# Patient Record
(demographics unavailable — no encounter records)

---

## 2024-10-07 NOTE — ASSESSMENT
[FreeTextEntry1] : 48 yo RHM w/ h/o chronic right hemifacial spasm here for chemodenervation. Had less response after last chemodenervation, increased dose of the nasalis injection and asked pt to take short video of face to help guide dosage adjustment at next visit.  In meantime, still awaiting MRI Brain w/wo von.   Recommendations: - MRI Brain w/w/o von - pt to take short video of facial response after 2 weeks - RTC in 3 months for chemodenervation.

## 2024-10-07 NOTE — PHYSICAL EXAM
[FreeTextEntry1] : NAD. AOx3. Lang intact, no dysarthria CN 2-12 w/ minimal intermittent R hemifacial spasm. slight facial asymmetry at rest with decreased palpebral fissure R side. Normal smile. MS 5/5 throughout sensory grossly intact no dysmetria, normal gait.

## 2024-10-07 NOTE — PROCEDURE
[FreeTextEntry1] : Chemodenervation for hemifacial spasm  Onabotulinum toxin A (Lot #  [id number], exp: 10/26), 1:1 dilution in sterile 0.9% NS  Patient prepped in appropriate fashion. Injection site anesthetized topically w/ Ethyl Chloride and prepped with alcohol swab. EMG guided injections performed w/ hypodermic needle electrod in following sites.  Injection sites (1 cc dilution): R orb oculi 2.5U x 5 sites (12.5U total), 5U R nasalis, 5U R zygomaticus, 2.5U depressor anguli oris. Total 25U injected w/o complications to the left arm. Minimal blood loss. Waste 75U.

## 2024-10-07 NOTE — HISTORY OF PRESENT ILLNESS
[FreeTextEntry1] : Since his last visit, Mr. Menon had partial response to last chemodenervation.  Not as robust as his prior injections with Dr. Carr but still had some effect.  Reports minimal effect in the R lower lid and upper face.  No excessive weakness or drooping.  Has not had MRI since last visit.  Is otherwise in his usual state of health.

## 2025-02-13 NOTE — ASSESSMENT
[FreeTextEntry1] : 49 yo RHM w/ h/o chronic right hemifacial spasm here for chemodenervation. Increased dosage in the depressor anguli oris currently.  Recommend f/u response. Still awaiting MRI Brain w/wo von.  Recommendations: - MRI Brain w/w/o von - pt to take short video of facial response after 2 weeks if needed - RTC in 3 - 4 months for chemodenervation.

## 2025-02-13 NOTE — PROCEDURE
[FreeTextEntry1] : Chemodenervation for hemifacial spasm  Onabotulinum toxin A (Lot #  [id number], exp: 5/27), 1:1 dilution in sterile 0.9% NS  Patient prepped in appropriate fashion. Injection site anesthetized topically w/ Ethyl Chloride and prepped with alcohol swab. EMG guided injections performed w/ hypodermic needle electrod in following sites.  Injection sites (1 cc dilution): R orb oculi 2.5U x 5 sites (12.5U total), 5U R nasalis, 5U R zygomaticus, 5U depressor anguli oris. Total 27.5U injected w/o complications to the left arm. Minimal blood loss.

## 2025-02-13 NOTE — HISTORY OF PRESENT ILLNESS
[FreeTextEntry1] : Since his last visit, Mr. Menon is doing well and responded well with last round of injection.  No significant facial weakness.  Continues to have recurrent spasms in the lower face around the mouth.  Otherwise tolerated injections well and would like to increase dose,

## 2025-07-28 NOTE — ASSESSMENT
[FreeTextEntry1] : 49 yo RHM w/ h/o chronic right hemifacial spasm here for chemodenervation. Reviewed prior documentation and no evidence of superorbital brow injections as reported by patient.  Will continue orbicularis oculi injections for now and recommend taking video 2 weeks after injection for reassessment at next visit.    Recommendations: - MRI Brain w/w/o von when able - pt to take short video of facial response after 2 weeks if needed - RTC in 3 - 4 months for chemodenervation.

## 2025-07-28 NOTE — PHYSICAL EXAM
[FreeTextEntry1] : NAD. AOx3. Lang intact, no dysarthria CN 2-12 w/ moderate intermittent R hemifacial spasm.  MS 5/5 throughout sensory grossly intact no dysmetria, normal gait.

## 2025-07-28 NOTE — HISTORY OF PRESENT ILLNESS
[FreeTextEntry1] : Since his last visit, Mr. Menon had good response to chemodenervation but recalls having 2 injections superior to the right brow in the past with Dr. Mckeon (not found in documentation).  Is otherwise tolerating injections well and has good effect for minimum of 2 months after each injection. Denies any spread of symptoms to contralateral side.  Is otherwise in his usual state of health.

## 2025-07-28 NOTE — PROCEDURE
[FreeTextEntry1] : Chemodenervation for hemifacial spasm  Onabotulinum toxin A (Lot #  [id number], exp: 10/27), 1:1 dilution in sterile 0.9% NS  Patient prepped in appropriate fashion. Injection site anesthetized topically w/ Ethyl Chloride and prepped with alcohol swab. EMG guided injections performed w/ hypodermic needle electrod in following sites.  Injection sites (1 cc dilution): R orb oculi 2.5U x 5 sites (12.5U total), 5U R nasalis, 5U R zygomaticus, 5U depressor anguli oris. Total 27.5U injected w/o complications to the left face. Minimal blood loss.